# Patient Record
Sex: MALE | Race: WHITE
[De-identification: names, ages, dates, MRNs, and addresses within clinical notes are randomized per-mention and may not be internally consistent; named-entity substitution may affect disease eponyms.]

---

## 2018-07-28 ENCOUNTER — HOSPITAL ENCOUNTER (INPATIENT)
Dept: HOSPITAL 12 - SRC | Age: 48
LOS: 5 days | Discharge: HOME | DRG: 895 | End: 2018-08-02
Attending: INTERNAL MEDICINE | Admitting: INTERNAL MEDICINE
Payer: COMMERCIAL

## 2018-07-28 VITALS — BODY MASS INDEX: 25.92 KG/M2 | WEIGHT: 175 LBS | HEIGHT: 69 IN

## 2018-07-28 VITALS — DIASTOLIC BLOOD PRESSURE: 98 MMHG | SYSTOLIC BLOOD PRESSURE: 146 MMHG

## 2018-07-28 VITALS — SYSTOLIC BLOOD PRESSURE: 145 MMHG | DIASTOLIC BLOOD PRESSURE: 92 MMHG

## 2018-07-28 VITALS — DIASTOLIC BLOOD PRESSURE: 95 MMHG | SYSTOLIC BLOOD PRESSURE: 138 MMHG

## 2018-07-28 DIAGNOSIS — F41.9: ICD-10-CM

## 2018-07-28 DIAGNOSIS — Z81.1: ICD-10-CM

## 2018-07-28 DIAGNOSIS — E83.42: ICD-10-CM
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
## 2018-07-28 DIAGNOSIS — F10.230: Primary | ICD-10-CM

## 2018-07-28 DIAGNOSIS — F11.10: ICD-10-CM

## 2018-07-28 DIAGNOSIS — D69.6: ICD-10-CM

## 2018-07-28 DIAGNOSIS — Z87.891: ICD-10-CM

## 2018-07-28 LAB
ALP SERPL-CCNC: 117 U/L (ref 50–136)
ALT SERPL W/O P-5'-P-CCNC: 72 U/L (ref 16–63)
AMPHETAMINES UR QL SCN>1000 NG/ML: NEGATIVE
AMYLASE SERPL-CCNC: 114 U/L (ref 25–115)
AST SERPL-CCNC: 89 U/L (ref 15–37)
BARBITURATES UR QL SCN: NEGATIVE
BASOPHILS # BLD AUTO: 0.1 K/UL (ref 0–8)
BASOPHILS NFR BLD AUTO: 1.4 % (ref 0–2)
BILIRUB SERPL-MCNC: 0.8 MG/DL (ref 0.2–1)
BUN SERPL-MCNC: 8 MG/DL (ref 7–18)
CHLORIDE SERPL-SCNC: 96 MMOL/L (ref 98–107)
CO2 SERPL-SCNC: 28 MMOL/L (ref 21–32)
COCAINE UR QL SCN: NEGATIVE
CREAT SERPL-MCNC: 0.9 MG/DL (ref 0.6–1.3)
EOSINOPHIL # BLD AUTO: 0 K/UL (ref 0–0.7)
EOSINOPHIL NFR BLD AUTO: 0.7 % (ref 0–7)
EOSINOPHIL NFR BLD MANUAL: 1 % (ref 0–8)
ETHANOL SERPL-MCNC: 132 MG/DL (ref 0–0)
GLUCOSE SERPL-MCNC: 137 MG/DL (ref 74–106)
HCT VFR BLD AUTO: 46.1 % (ref 36.7–47.1)
HGB BLD-MCNC: 15.7 G/DL (ref 12.5–16.3)
LIPASE SERPL-CCNC: 208 U/L (ref 73–393)
LYMPHOCYTES # BLD AUTO: 1.2 K/UL (ref 20–40)
LYMPHOCYTES NFR BLD AUTO: 19.8 % (ref 20.5–51.5)
LYMPHOCYTES NFR BLD MANUAL: 21 % (ref 20–40)
MAGNESIUM SERPL-MCNC: 1.5 MG/DL (ref 1.8–2.4)
MCH RBC QN AUTO: 31.7 UUG (ref 23.8–33.4)
MCHC RBC AUTO-ENTMCNC: 34 G/DL (ref 32.5–36.3)
MCV RBC AUTO: 92.8 FL (ref 73–96.2)
MONOCYTES # BLD AUTO: 0.5 K/UL (ref 2–10)
MONOCYTES NFR BLD AUTO: 7.4 % (ref 0–11)
MONOCYTES NFR BLD MANUAL: 6 % (ref 2–10)
NEUTROPHILS # BLD AUTO: 4.4 K/UL (ref 1.8–8.9)
NEUTROPHILS NFR BLD AUTO: 70.7 % (ref 38.5–71.5)
NEUTS BAND NFR BLD MANUAL: 2 % (ref 0–10)
NEUTS SEG NFR BLD MANUAL: 70 % (ref 42–75)
OPIATES UR QL SCN: NEGATIVE
PCP UR QL SCN>25 NG/ML: NEGATIVE
PLATELET # BLD AUTO: 65 K/UL (ref 152–348)
POTASSIUM SERPL-SCNC: 3.1 MMOL/L (ref 3.5–5.1)
RBC # BLD AUTO: 4.97 MIL/UL (ref 4.06–5.63)
THC UR QL SCN>50 NG/ML: NEGATIVE
TSH SERPL DL<=0.005 MIU/L-ACNC: 1.16 MIU/ML (ref 0.36–3.74)
WBC # BLD AUTO: 6.2 K/UL (ref 3.6–10.2)
WS STN SPEC: 8 G/DL (ref 6.4–8.2)

## 2018-07-28 PROCEDURE — G0480 DRUG TEST DEF 1-7 CLASSES: HCPCS

## 2018-07-28 PROCEDURE — A4663 DIALYSIS BLOOD PRESSURE CUFF: HCPCS

## 2018-07-28 PROCEDURE — HZ2ZZZZ DETOXIFICATION SERVICES FOR SUBSTANCE ABUSE TREATMENT: ICD-10-PCS | Performed by: INTERNAL MEDICINE

## 2018-07-28 RX ADMIN — CLONIDINE HYDROCHLORIDE PRN MG: 0.1 TABLET ORAL at 22:03

## 2018-07-28 RX ADMIN — LORAZEPAM SCH MG: 1 TABLET ORAL at 22:03

## 2018-07-28 RX ADMIN — LORAZEPAM SCH MG: 1 TABLET ORAL at 16:50

## 2018-07-28 RX ADMIN — LORAZEPAM SCH MG: 1 TABLET ORAL at 13:25

## 2018-07-28 NOTE — NUR
PRN CLONIDINE REASSESSMENT



Patient has a BP of 124/82, HR 94. PRN Clonidine was effective. Safety measures in place, 
side rails up x2, bed locked in low position, call light within reach. Will continue to 
monitor.

## 2018-07-28 NOTE — NUR
K-dur, magnesium and taper medication;



Patient's potassium level is low 3.1 and magnesium level of 1.5. MD made aware. MD ordered 
K-dur 40meq PO one time and Mag-ox 400mg PO one time for supplement. MD also started patient 
on 4 day Ativan taper to help prevent withdrawal symptoms. Medications given as ordered.

## 2018-07-28 NOTE — NUR
PRN Medication;



Patient is currently showing s/s of withdrawals manifested by increased anxiety, 
restlessness, agitation, intermittent perspiration, stomach cramps, visible tremors, 
headache, unable to sit still, difficulty concentrating, racing thoughts with CIWA score of 
14. MD made aware. MD ordered PRN Ativan 1mg PO for CIWA of 14.  All safety measures 
secured. Will continue to monitor patient.

## 2018-07-28 NOTE — NUR
Admission Assessment;



Patient is a 48 year old male, A/O x4, appears well nourished and is dressed appropriately. 
Patient admitted to [facility] for medically supervised withdrawal from ETOH. Patient 
appears nervous, anxious, flushed face noted, intermittent perspiration,Tremors noted, teary 
eyes, Appears older than age stated. Patient has poor eye contact, fearful, 
worried,verbalized feelings of guilt and shame but remained cooperative during nurse 
assessment. Patient denies history of hypertension/cardiac disorder, patient verbalized "I 
am very nervous right now, my vital signs might be elevated but it's usually WNL". Admitting 
vital signs are as follows; /90,  , Temperature 97.6, respirations of 18, Spo2 
98% on room air, denies pain. Patient denies history of seizures and denies any allergies. 



Discussed substance use history. Patient started drinking ETOH when he was 18 year old, 
progressed to daily use 3 years ago. Since then he has been drinking White Wine 
approximately 1-3 bottles /day (750ml bottle), last drank 7/27/18 at 7PM patient reported 
drinking 2 750ml bottles of wine last night. Patient struggled with multiple attempts on 
achieving sobriety. Patient achieved 5 years of sobriety from 3571-4954. Most recent 
sobriety was from June1, 2018 until July 15, 2018. At this rate patient has been drinking on 
daily basis for the past 2 weeks. During his periods of sobriety patient experienced 
tremors, anxiety and agitation. Patient has never been to detox or outpatient treatment 
before. Patient stated "I enjoy drinking wine after a long day of work, but for the past 2 
weeks i have been drinking a lot more. Drinking ETOH has been affecting my work performance 
and i cannot let this addiction affect my career, i worked hard to be in my position . I 
need help, i need to fix my life". Triggers for relapse includes, availability of ETOH. 
Patient also stated "Also my friends are alcoholics". Patient is open to post treatment 
discharge options and is motivated to remain sober. 



Discussed medical and psychiatric history. He reported that he is taking Multivitamin 1 tab 
PO daily and Fish oil 1000mg daily for supplement. Patient did not bring medications 
mentioned. Patient reported family history of diabetes and hypertension. Patient denies 
other medical /psychiatric condition. Skin check done. Small redness noted on back of 
patient right leg, site is intact. Bruised toe also noted on patient's left foot. Site is 
intact. Oriented patient to unit. Educated patient regarding the importance of compliance to 
unit protocol and policies, patient verbalized understanding. He denies having PCP or 
psychiatrist. Patient is admitted to room 329 under the care of Dr. Carmona. Patient is 
currently showing s/s of withdrawals with CIWA score of 14, reported to MD. All safety 
measures secured. Will continue to monitor patient. 

-------------------------------------------------------------------------------

Addendum: 07/28/18 at 1544 by ANITHA JUNG LVN

-------------------------------------------------------------------------------

Patient denies suicidal ideations.

## 2018-07-28 NOTE — NUR
CIWA assessment;



Patient is currently showing s/s of withdrawals manifested by increased anxiety, 
restlessness, agitation, intermittent perspiration, stomach cramps, visible tremors, 
headache, unable to sit still, difficulty concentrating, racing thoughts with CIWA score of 
13. Patient received PRN medication to help reduce withdrawal symptoms.

## 2018-07-28 NOTE — NUR
End of shift note;



Patient is AOX4, presented with anxiety, restlessness, agitation, intermittent perspiration, 
stomach cramps, visible tremors, headache, unable to sit still, difficulty concentrating, 
racing thoughts , headache, last CIWA score of 18 noted at 1600. Patient was started on a 4 
day Ativan taper to help manage withdrawal symptoms. Patient received PRN Ativan 1mg for 
CIWA of 14 noted to be effective. Potassium and magnesium were supplemented by K-dur and Mag 
-ox per MD order. Patient remained compliant with treatment plan and medication regime. 
Encouraged patient to verbalized feelings and to report any further s/s of withdrawals. 
Medications were effective in reducing withdrawal symptoms. All safety measures secured. Met 
all needs.

## 2018-07-28 NOTE — NUR
CIWA assessment;



Patient is currently showing s/s of withdrawals manifested by increased anxiety, 
restlessness, agitation, intermittent perspiration, stomach cramps, visible tremors, 
headache, unable to sit still, difficulty concentrating, racing thoughts , headache with 
CIWA score of 16. Patient received PRN and scheduled medications to help reduce withdrawal 
symptoms. Patient was also started on a 5 day Ativan taper. Will continue to monitor 
patient.

## 2018-07-28 NOTE — NUR
CIWA DEFERRED



CIWA deferred at this time due to patient sleeping; to be assessed and scored while patient 
is awake. Safety measures in place, side rails up x2, bed locked in low position, call light 
within reach. Will continue to monitor.

## 2018-07-28 NOTE — NUR
PRN CLONIDINE



Patient has a BP of 146/98, . PRN Clonidine given PO. Safety measures in place, side 
rails up x2, bed locked in low position, call light within reach. Will monitor for 
effectiveness.

## 2018-07-28 NOTE — NUR
START OF SHIFT



Patient is a 48-year-old male admitted this morning, 7/28/18, for ETOH withdrawal. Patient 
is currently on a 4-day Ativan taper, tolerating well. Patients last CIWA was 18 per 
endorsement. Patient received PRN Ativan 1mg once today; noted to be effective. Upon 
assessment, patient appears flushed, disheveled and diaphoretic. Patient has gross tremors 
and is visibly tired. Patient is alert and oriented x4 and his gait is mostly steady but 
patient needs to walk slowly to keep his balance. Patient is quiet and withdrawn. Patient is 
on fall and seizure precautions with no history of seizure. Safety measures in place, side 
rails up x2, bed locked in low position, call light within reach. Will continue to monitor.

## 2018-07-28 NOTE — NUR
Preadmission Note:

Pt is 48M, AOx4. Pt reported NKA. Pt observed to be very anxious, with racing thoughts but 
still motivated to be in treatment. Pt stable at this time and able to answer assessment 
questions. Pt reports hx of drinking white wine. No medical hx or previous seizures 
reported. Instructed pt about unit policies and procedures. Pt verbalized understanding. 
Will admit to [facility] floor.

## 2018-07-28 NOTE — NUR
Re-assessment;



Patient current CIWA score remained elevated manifested by anxiety, agitation, intermittent 
perspiration, stomach cramps, tremors headache, difficulty concentrating. MD on unit to 
evaluate patient.

## 2018-07-28 NOTE — NUR
CIWA 21



Patient is awake and requested snacks from the kitchen. Patient is diaphoretic, flushed, and 
tremulous. Patient reports "moderate tingling" around his nose and cheeks. Current CIWA is 
21. SN to administer meds as ordered. Safety measures in place, call light within reach. 
Will continue to monitor.

## 2018-07-29 VITALS — SYSTOLIC BLOOD PRESSURE: 123 MMHG | DIASTOLIC BLOOD PRESSURE: 81 MMHG

## 2018-07-29 VITALS — DIASTOLIC BLOOD PRESSURE: 78 MMHG | SYSTOLIC BLOOD PRESSURE: 121 MMHG

## 2018-07-29 VITALS — SYSTOLIC BLOOD PRESSURE: 135 MMHG | DIASTOLIC BLOOD PRESSURE: 89 MMHG

## 2018-07-29 VITALS — SYSTOLIC BLOOD PRESSURE: 118 MMHG | DIASTOLIC BLOOD PRESSURE: 90 MMHG

## 2018-07-29 VITALS — SYSTOLIC BLOOD PRESSURE: 138 MMHG | DIASTOLIC BLOOD PRESSURE: 89 MMHG

## 2018-07-29 VITALS — SYSTOLIC BLOOD PRESSURE: 138 MMHG | DIASTOLIC BLOOD PRESSURE: 92 MMHG

## 2018-07-29 LAB
ALP SERPL-CCNC: 152 U/L (ref 50–136)
ALT SERPL W/O P-5'-P-CCNC: 59 U/L (ref 16–63)
AST SERPL-CCNC: 78 U/L (ref 15–37)
BASOPHILS # BLD AUTO: 0.1 K/UL (ref 0–8)
BASOPHILS NFR BLD AUTO: 1.2 % (ref 0–2)
BASOPHILS NFR BLD MANUAL: 1 % (ref 0–2)
BILIRUB SERPL-MCNC: 0.9 MG/DL (ref 0.2–1)
BUN SERPL-MCNC: 12 MG/DL (ref 7–18)
CHLORIDE SERPL-SCNC: 103 MMOL/L (ref 98–107)
CO2 SERPL-SCNC: 32 MMOL/L (ref 21–32)
CREAT SERPL-MCNC: 0.9 MG/DL (ref 0.6–1.3)
EOSINOPHIL # BLD AUTO: 0.2 K/UL (ref 0–0.7)
EOSINOPHIL NFR BLD AUTO: 4.2 % (ref 0–7)
EOSINOPHIL NFR BLD MANUAL: 5 % (ref 0–8)
GLUCOSE SERPL-MCNC: 137 MG/DL (ref 74–106)
HCT VFR BLD AUTO: 44.2 % (ref 36.7–47.1)
HGB BLD-MCNC: 15.4 G/DL (ref 12.5–16.3)
LYMPHOCYTES # BLD AUTO: 1.1 K/UL (ref 20–40)
LYMPHOCYTES NFR BLD AUTO: 23.1 % (ref 20.5–51.5)
LYMPHOCYTES NFR BLD MANUAL: 20 % (ref 20–40)
MCH RBC QN AUTO: 32.1 UUG (ref 23.8–33.4)
MCHC RBC AUTO-ENTMCNC: 35 G/DL (ref 32.5–36.3)
MCV RBC AUTO: 92 FL (ref 73–96.2)
MONOCYTES # BLD AUTO: 0.5 K/UL (ref 2–10)
MONOCYTES NFR BLD AUTO: 10.6 % (ref 0–11)
MONOCYTES NFR BLD MANUAL: 8 % (ref 2–10)
NEUTROPHILS # BLD AUTO: 2.8 K/UL (ref 1.8–8.9)
NEUTROPHILS NFR BLD AUTO: 60.9 % (ref 38.5–71.5)
NEUTS BAND NFR BLD MANUAL: 5 % (ref 0–10)
NEUTS SEG NFR BLD MANUAL: 61 % (ref 42–75)
PLATELET # BLD AUTO: 50 K/UL (ref 152–348)
POTASSIUM SERPL-SCNC: 4.1 MMOL/L (ref 3.5–5.1)
RBC # BLD AUTO: 4.8 MIL/UL (ref 4.06–5.63)
WBC # BLD AUTO: 4.7 K/UL (ref 3.6–10.2)
WS STN SPEC: 6.5 G/DL (ref 6.4–8.2)

## 2018-07-29 RX ADMIN — Medication SCH MG: at 09:16

## 2018-07-29 RX ADMIN — LORAZEPAM SCH MG: 1 TABLET ORAL at 20:56

## 2018-07-29 RX ADMIN — LORAZEPAM SCH MG: 1 TABLET ORAL at 16:28

## 2018-07-29 RX ADMIN — THERA TABS SCH UDTAB: TAB at 09:15

## 2018-07-29 RX ADMIN — Medication SCH CAP: at 09:16

## 2018-07-29 RX ADMIN — LORAZEPAM SCH MG: 1 TABLET ORAL at 12:33

## 2018-07-29 RX ADMIN — LORAZEPAM SCH MG: 1 TABLET ORAL at 09:16

## 2018-07-29 RX ADMIN — FOLIC ACID SCH MG: 1 TABLET ORAL at 09:17

## 2018-07-29 NOTE — NUR
ABDOMINAL U/S

Pt informed about abdominal ultrasound to be done in the morning and advised not to eat or 
drink after midnight.Pt verbalizes understanding.

## 2018-07-29 NOTE — NUR
End of shift note;



Patient is AOX4, presented with anxiety, restlessness, agitation, intermittent perspiration, 
stomach cramps, visible tremors, headache, unable to sit still, difficulty concentrating, 
racing thoughts , headache, last CIWA score of 18 noted at 1600. Patient was placed on a 4 
day Ativan taper to help manage withdrawal symptoms. Patient remained compliant with 
treatment plan and medication regime. Encouraged patient to verbalized feelings and to 
report any further s/s of withdrawals. Encouraged patient to participate in group therapies. 
Medications were effective in reducing withdrawal symptoms. All safety measures secured. Met 
all needs.

## 2018-07-29 NOTE — NUR
END OF SHIFT



Patient is a 48-year-old male admitted yesterday, 7/28/18, for ETOH withdrawal. Patient is 
currently on a 4-day Ativan taper, tolerating well; today will be second day of taper. 
Patients last CIWA was 21. Patient received PRN Clonidine for elevated BP; noted to be 
effective. Patient slept for 8 hours, total intake of 1,000mL, void x1, stool x0. Patient is 
on fall and seizure precautions with no history of seizure. Safety measures in place, side 
rails up x2, bed locked in low position, call light within reach. Will endorse to day shift.

## 2018-07-29 NOTE — NUR
PRN REASSESSMENT

Pt stated he had an episode of diarrhea immediately after Imodium was given.No c/o diarrhea 
noted at this time,will continue to monitor.

## 2018-07-29 NOTE — NUR
CIWA assessment;



Patient is AOx4,  currently showing s/s of withdrawals manifested by increased anxiety, 
restlessness, agitation, intermittent perspiration, stomach cramps, visible tremors, 
headache, unable to sit still, difficulty concentrating, racing thoughts , headache with 
CIWA score of 18. Patient received scheduled medications to help reduce withdrawal symptoms. 
Will continue to monitor patient.

## 2018-07-29 NOTE — NUR
Start of shift note;



Received report from night nurse. Patient is a 47 y/o male admitted on 7/28/18 for ETOH 
withdrawals. Patient is AOX4, presented with anxiety, restlessness, agitation, appears older 
than age stated, intermittent perspiration, stomach cramps, visible tremors, headache, 
unable to sit still, difficulty concentrating, racing thoughts , headache, shows feelings of 
shame and guilt, last CIWA score of 21 per endorsement. Patient was started on a 4 day 
Ativan taper to help manage withdrawal symptoms. Patient received PRN Clonidine not elevated 
BP noted to be effective per endorsement. Patient slept for 8 hours. All safety measures 
secured. Will continue to monitor patient.

## 2018-07-29 NOTE — NUR
CIWA assessment;



Patient is AOx4,  currently showing s/s of withdrawals manifested by increased anxiety, 
restlessness, agitation, intermittent perspiration, stomach cramps, visible tremors, 
headache, unable to sit still, difficulty concentrating, racing thoughts , headache with 
CIWA score of 19. Patient received scheduled medications to help reduce withdrawal symptoms. 
Will continue to monitor patient.

## 2018-07-29 NOTE — NUR
PRN IMODIUM

Pt c/o having diarrhea,stated that he had 5-6 loose stools today.Imodium 4 mg PO given per 
orders;PO fluids encouraged, will continue to monitor.

## 2018-07-29 NOTE — NUR
CIWA DEFERRED



CIWA deferred at this time due to patient sleeping; to be assessed while patient is awake, 
per protocol. Respirations even and unlabored. Safety measures in place, call light within 
reach. Will continue to monitor.

## 2018-07-29 NOTE — NUR
START OF SHIFT

Pt is a 49 y/o male admitted  for ETOH withdrawals. Patient is A/A/O X 4, continues on 
Ativan taper as ordered and is tolerating well. Pt  presented with anxiety, restlessness and 
generalized aches and pain. Last CIWA score  was 18 at 1600. Pt remains  compliant with 
treatment plan and medication regime. No PRN medications were given during the day.Pt is to 
be NPO after midnight for abdominal ultrasound tomorrow .Pt  encouraged patient to verbalize 
needs and concerns and to report any  s/s of withdrawals. All safety measures in place.  Bed 
on lowest position with side rails x2 up for safety. Call light within reach, will continue 
to monitor.

## 2018-07-29 NOTE — NUR
CIWA DEFERRED



CIWA deferred due to patient sleeping; to be assessed and scored while patient is awake. 
Safety measures in place, side rails up x2, bed locked in low position, call light within 
reach. Will continue to monitor.

## 2018-07-29 NOTE — NUR
CIWA assessment;



Patient is AOx4,  currently showing s/s of withdrawals manifested by increased anxiety, 
restlessness, agitation, intermittent perspiration, stomach cramps, visible tremors, 
headache, unable to sit still, difficulty concentrating, racing thoughts , headache with 
CIWA score of 21. Patient to receive scheduled medications to help reduce withdrawal 
symptoms. Notified MD regarding patient's LAB results. Will continue to monitor patient.

## 2018-07-30 VITALS — DIASTOLIC BLOOD PRESSURE: 106 MMHG | SYSTOLIC BLOOD PRESSURE: 160 MMHG

## 2018-07-30 VITALS — DIASTOLIC BLOOD PRESSURE: 87 MMHG | SYSTOLIC BLOOD PRESSURE: 138 MMHG

## 2018-07-30 VITALS — SYSTOLIC BLOOD PRESSURE: 145 MMHG | DIASTOLIC BLOOD PRESSURE: 98 MMHG

## 2018-07-30 VITALS — DIASTOLIC BLOOD PRESSURE: 96 MMHG | SYSTOLIC BLOOD PRESSURE: 152 MMHG

## 2018-07-30 VITALS — DIASTOLIC BLOOD PRESSURE: 80 MMHG | SYSTOLIC BLOOD PRESSURE: 139 MMHG

## 2018-07-30 VITALS — DIASTOLIC BLOOD PRESSURE: 87 MMHG | SYSTOLIC BLOOD PRESSURE: 126 MMHG

## 2018-07-30 LAB
ALP SERPL-CCNC: 188 U/L (ref 50–136)
ALT SERPL W/O P-5'-P-CCNC: 74 U/L (ref 16–63)
AST SERPL-CCNC: 71 U/L (ref 15–37)
BASOPHILS # BLD AUTO: 0 K/UL (ref 0–8)
BASOPHILS NFR BLD AUTO: 0.7 % (ref 0–2)
BASOPHILS NFR BLD MANUAL: 0 % (ref 0–2)
BILIRUB SERPL-MCNC: 0.6 MG/DL (ref 0.2–1)
BUN SERPL-MCNC: 12 MG/DL (ref 7–18)
CHLORIDE SERPL-SCNC: 101 MMOL/L (ref 98–107)
CO2 SERPL-SCNC: 33 MMOL/L (ref 21–32)
CREAT SERPL-MCNC: 1 MG/DL (ref 0.6–1.3)
EOSINOPHIL # BLD AUTO: 0.3 K/UL (ref 0–0.7)
EOSINOPHIL NFR BLD AUTO: 5.9 % (ref 0–7)
EOSINOPHIL NFR BLD MANUAL: 4 % (ref 0–8)
GLUCOSE SERPL-MCNC: 122 MG/DL (ref 74–106)
HCT VFR BLD AUTO: 47.9 % (ref 36.7–47.1)
HGB BLD-MCNC: 16.4 G/DL (ref 12.5–16.3)
LYMPHOCYTES # BLD AUTO: 1.7 K/UL (ref 20–40)
LYMPHOCYTES NFR BLD AUTO: 28.6 % (ref 20.5–51.5)
LYMPHOCYTES NFR BLD MANUAL: 29 % (ref 20–40)
MAGNESIUM SERPL-MCNC: 1.8 MG/DL (ref 1.8–2.4)
MCH RBC QN AUTO: 31.8 UUG (ref 23.8–33.4)
MCHC RBC AUTO-ENTMCNC: 34 G/DL (ref 32.5–36.3)
MCV RBC AUTO: 93.1 FL (ref 73–96.2)
MONOCYTES # BLD AUTO: 0.6 K/UL (ref 2–10)
MONOCYTES NFR BLD AUTO: 9.9 % (ref 0–11)
MONOCYTES NFR BLD MANUAL: 3 % (ref 2–10)
NEUTROPHILS # BLD AUTO: 3.2 K/UL (ref 1.8–8.9)
NEUTROPHILS NFR BLD AUTO: 54.9 % (ref 38.5–71.5)
NEUTS BAND NFR BLD MANUAL: 0 % (ref 0–10)
NEUTS SEG NFR BLD MANUAL: 64 % (ref 42–75)
PLATELET # BLD AUTO: 56 K/UL (ref 152–348)
POTASSIUM SERPL-SCNC: 3.4 MMOL/L (ref 3.5–5.1)
RBC # BLD AUTO: 5.15 MIL/UL (ref 4.06–5.63)
WBC # BLD AUTO: 5.8 K/UL (ref 3.6–10.2)
WS STN SPEC: 7.6 G/DL (ref 6.4–8.2)

## 2018-07-30 RX ADMIN — LORAZEPAM SCH MG: 1 TABLET ORAL at 14:43

## 2018-07-30 RX ADMIN — THERA TABS SCH UDTAB: TAB at 08:36

## 2018-07-30 RX ADMIN — FOLIC ACID SCH MG: 1 TABLET ORAL at 08:35

## 2018-07-30 RX ADMIN — LORAZEPAM SCH MG: 1 TABLET ORAL at 08:36

## 2018-07-30 RX ADMIN — CLONIDINE HYDROCHLORIDE PRN MG: 0.1 TABLET ORAL at 08:36

## 2018-07-30 RX ADMIN — Medication SCH CAP: at 08:36

## 2018-07-30 RX ADMIN — LORAZEPAM SCH MG: 1 TABLET ORAL at 21:09

## 2018-07-30 RX ADMIN — Medication SCH MG: at 08:36

## 2018-07-30 NOTE — NUR
CIWA SCORE

CIWA score 14, patient continues to presents anxiety, agitation, restless, sweats, bilateral 
hand tremors. Will cont to monitor.

## 2018-07-30 NOTE — NUR
CIWA=10

Pt is feeling anxious,restless,c/o having mild tremors and headache.PRN medications offered 
but Pt refused to take any.No c/o diarrhea noted,will continue to monitor.

## 2018-07-30 NOTE — NUR
PRN CLONIDINE

Patient's blood noted 162/106, PRN clonidine 0.1mg PO as ordered. Will cont to monitor and 
reassess the patient.

## 2018-07-30 NOTE — NUR
START OF SHIFT

Pt is a 48 year old male admitted for ETOH withdrawal and continues to be on  Ativan taper, 
tolerating well. Pt received in room continues  to c/o  anxiety, restlessness,superficially 
bright and pleasant  upon approach .PRN Clonidine 0.1mg PO  was given per day shift and 
noted to be effective. PO fluids encouraged  as tolerated. Last CIWA score was 12. 
Encouraged patient to attend groups activities to learn new coping skills. All safety 
measures in place, call light within reach, will continue to monitor for safety.

## 2018-07-30 NOTE — NUR
END OF SHIFT

Pt is a 49 y/o male admitted  for ETOH withdrawals. Patient is A/A/O X 4, continues on 
Ativan taper as ordered and is tolerating well. Pt  presented with anxiety, restlessness and 
generalized aches and pain. Last CIWA score  was 18 at 2000. Pt remains  compliant with 
treatment plan and medication regime. PRN medications Imodium was given for c/o diarrhea x 1 
and was effective.Pt  has been  NPO after midnight for abdominal ultrasound today .Pt  slept 
8 hrs,fluid intake was 1000 mls,voided x 1,has B/M  x2. All safety measures in place.  Bed 
on lowest position with side rails x2 up for safety. Call light within reach, will endorse 
care to day shift nurse.

## 2018-07-30 NOTE — NUR
START OF SHIFT NOTE

Received report from night nurse, 48 year old male admitted for ETOH withdrawal patient 
continues on Ativan taper tolerating well. Per endorsement patient received PRN Imodium 
effective per night nurse, last CIWA score was 18, slept for 8 hours. Received patient alert 
awake anxious, agitation, bilateral hand termones noted,light headed, numbness and tingling 
on bilateral hands.Educated patient importance of attending groups and activities with good 
verbal understanding.  All safety measures in place, Call light within reach. Will cont to 
monitor.

## 2018-07-30 NOTE — NUR
Clinical Therapy Note:

Met with patient in his room where he was sad and tearful. Pt. is showing some insight into 
his alcoholism and recognizes he needs treatment. He commented " I know I cannot stop 
drinking when I start but I keep doing it."  He was prompted to attend groups but at this 
point wants to stay in his room. He responded well to one to one intervention and wanted to 
know when this clinician would return. Advised him she would check in with him tomorrow too. 
Group attendance was encouraged. Patient is very open to an aftercare program. Sandie will 
meet with him to discuss this.

Patient's mood is depressed with congruent affect. He cried a lot in this writer's presence. 
He is not suicidal. He has a good support system and says his uncle drove him to [facility]. 
Patient commented "  I have a great job and career and many people who love me. Why do I 
keep drinking?" Psychoeducation regarding alcohol dependence was provided to the patient. 
This is his first attempt at hospital detox. he says he usually did this at home after a 
sousa. He got less and less control over his binges. Pt. says he would stay home over 
vacations and just drink. 

Patient's request to meet with HR from his employer was passed on to GINGER Jensen 
. Patient is concerned about his work status.

## 2018-07-30 NOTE — NUR
END OF SHIFT NOTE

Gave report to night nurse, 48 year old male admitted for ETOH withdrawal and continues with 
Ativan taper tolerating well. Patient continues to anxiety, agitation, restless, labile 
facial expression, anhedonia, unkempt room, diaphoretic, bilateral hand tremors noted. 
patient was given scheduled medications and PRN Clonidine 0.1mg PO noted to be effective. 
Encourage PO fluids as tolerated. Last CIWA score was 12. Encourage patient to attend groups 
activities to learn new coping skills. All safety measures in place, call light within 
reach. Patient endorse to night nurse in stable condition.

## 2018-07-30 NOTE — NUR
CIWA SCORE

CIWA score 15, Patient presented with  anxiety, agitation, restless, sweats, patient is due 
for scheduled medications. Will cont to monitor.

## 2018-07-30 NOTE — NUR
CIWA DEFERRED.

Pt is resting comfortable in bed with eyes closed;breathing is even and non labored; no s/s 
of distress noted; CIWA deferred due to patient being asleep;all safety measures in place, 
will continue to monitor.

## 2018-07-30 NOTE — NUR
CIWA ASSESSMENT

CIWA score noted 12, patient reported decreased in anxiety, agitation, restless.Patient 
reported detox medication are effective with evidence of low CIWA score 12. Will to monitor.

## 2018-07-31 VITALS — SYSTOLIC BLOOD PRESSURE: 137 MMHG | DIASTOLIC BLOOD PRESSURE: 89 MMHG

## 2018-07-31 VITALS — DIASTOLIC BLOOD PRESSURE: 117 MMHG | SYSTOLIC BLOOD PRESSURE: 156 MMHG

## 2018-07-31 VITALS — SYSTOLIC BLOOD PRESSURE: 129 MMHG | DIASTOLIC BLOOD PRESSURE: 86 MMHG

## 2018-07-31 VITALS — DIASTOLIC BLOOD PRESSURE: 90 MMHG | SYSTOLIC BLOOD PRESSURE: 145 MMHG

## 2018-07-31 VITALS — DIASTOLIC BLOOD PRESSURE: 102 MMHG | SYSTOLIC BLOOD PRESSURE: 147 MMHG

## 2018-07-31 VITALS — SYSTOLIC BLOOD PRESSURE: 121 MMHG | DIASTOLIC BLOOD PRESSURE: 77 MMHG

## 2018-07-31 VITALS — DIASTOLIC BLOOD PRESSURE: 89 MMHG | SYSTOLIC BLOOD PRESSURE: 140 MMHG

## 2018-07-31 PROCEDURE — HZ31ZZZ INDIVIDUAL COUNSELING FOR SUBSTANCE ABUSE TREATMENT, BEHAVIORAL: ICD-10-PCS

## 2018-07-31 RX ADMIN — THERA TABS SCH UDTAB: TAB at 08:17

## 2018-07-31 RX ADMIN — LORAZEPAM SCH MG: 1 TABLET ORAL at 08:17

## 2018-07-31 RX ADMIN — CLONIDINE HYDROCHLORIDE PRN MG: 0.1 TABLET ORAL at 20:10

## 2018-07-31 RX ADMIN — LORAZEPAM SCH MG: 1 TABLET ORAL at 20:10

## 2018-07-31 RX ADMIN — FOLIC ACID SCH MG: 1 TABLET ORAL at 08:17

## 2018-07-31 RX ADMIN — Medication SCH CAP: at 08:17

## 2018-07-31 RX ADMIN — Medication SCH MG: at 08:17

## 2018-07-31 RX ADMIN — CLONIDINE HYDROCHLORIDE PRN MG: 0.1 TABLET ORAL at 00:27

## 2018-07-31 NOTE — NUR
CIWA ASSESSMENT

CIWA score 15, Patient presented with  anxiety, agitation, restless, sweats, bilateral hand 
tremors, light headed. Patient is due for scheduled medications. Will cont to monitor.

## 2018-07-31 NOTE — NUR
START OF SHIFT



Pt is a 47 y/o male admitted on 07/28/18 for ETOH withdrawal. Pt is on a 4 day Ativan taper, 
tolerating well. Last CIWA 12 and no PRNs administered during day shift. Upon assessment pt 
presents with anxiety, restlessness, racing thoughts, disheveled appearance, sweats, flushed 
skin, intermittent tremors, elevated BP, elevated HR and is withdrawn. Medications due. 
Safety measures in place. Call light within reach. Will continue to monitor.

## 2018-07-31 NOTE — NUR
PRN CLONIDINE ADMINISTRATION



/117 and . Safety measures in place. Call light within reach. Will continue to 
monitor.

## 2018-07-31 NOTE — NUR
CIWA SCORE

CIWA score 12, Patient back from groups, and continues to exhibits s/s of withdrawal such as 
anxiety, agitation, restless, sweats, bilateral hand tremors noted. Will cont to monitor.

## 2018-07-31 NOTE — NUR
PRN CLONIDINE REASSESSMENT



/102 and HR 89. Pt has improvement in anxiety. Safety measures in place. Call light 
within reach. Will continue to monitor.

## 2018-07-31 NOTE — NUR
CIWA SCORE

CIWA score 14, Patient continues to exhibits s/s of withdrawal such as anxiety, agitation, 
restless, sweats, anhedonia,  Will cont to monitor.

## 2018-07-31 NOTE — NUR
PRN BENADRYL ADMINISTRATION



Pt requests sleep aid. Pt has difficulty falling asleep. Safety measures in place. Call 
light within reach. Will continue to monitor.

## 2018-07-31 NOTE — NUR
----- Message from EDVIN Day sent at 1/16/2017  2:41 PM PST -----  Please inform parent that triglycerides are elevated, and HDL (the good cholesterol) is a little low. We would recommend a diet low in saturated fats. No fast food or highly processed foods.   PRN F/U

PRN IS EFFECTIVE.PT IS CALM AND RESTING WITH EYES CLOSED,NO S/S OF ANXIETY N0TED.WILL 
CONTINUE TO MONITOR.

## 2018-07-31 NOTE — NUR
CIWA 11



Pt presents with anxiety, restlessness, racing thoughts, disheveled appearance, sweats, 
flushed skin, intermittent tremors, elevated BP, elevated HR and is withdrawn. Pt reports 
that he sometimes has trouble falling and staying asleep.

## 2018-07-31 NOTE — NUR
END OF SHIFT NOTE

Gave report to night nurse, patient continues with Ativan taper tolerating well. Patient 
presented with anxiety, agitation, restless, anhedonia, bilateral hand tremors, labile 
facial expression, sweats, patient was given scheduled medications and did not receive any 
PRN. Patient able to eat 100% of his meals.  Encourage pt to attend group activities to 
learn new coping skills. Encourage PO fluids as tolerated. All safety measures in place, 
Call light within reach. Patient endorsed to night nurse in stable condition.

## 2018-07-31 NOTE — NUR
START OF SHIFT NOTE

Received report from night nurse, 48 year old male admitted for ETOH withdrawal patient 
continues on Ativan taper tolerating well. Per endorsement patient received PRN Clonidine 
effective per night nurse, last CIWA score was 10, slept for 7 hours. Received patient alert 
awake anxious, agitation, bilateral hand termones noted, light headed, numbness and tingling 
on bilateral hands. Educated patient importance of attending groups and activities with good 
verbal understanding.  All safety measures in place, Call light within reach. Will cont to 
monitor.

## 2018-07-31 NOTE — NUR
Therapist prompted client to attend twice daily group therapy sessions. Client explained 
that he is feeling very fatigued but that he would try to attend the second therapy group of 
the day.

## 2018-07-31 NOTE — NUR
PRN BENADRYL REASSESSMENT



Pt remains awake, appears more relaxed. Safety measures in place. Call light within reach. 
Will continue to monitor.

## 2018-08-01 VITALS — DIASTOLIC BLOOD PRESSURE: 78 MMHG | SYSTOLIC BLOOD PRESSURE: 119 MMHG

## 2018-08-01 VITALS — SYSTOLIC BLOOD PRESSURE: 139 MMHG | DIASTOLIC BLOOD PRESSURE: 102 MMHG

## 2018-08-01 VITALS — SYSTOLIC BLOOD PRESSURE: 151 MMHG | DIASTOLIC BLOOD PRESSURE: 112 MMHG

## 2018-08-01 VITALS — DIASTOLIC BLOOD PRESSURE: 99 MMHG | SYSTOLIC BLOOD PRESSURE: 143 MMHG

## 2018-08-01 VITALS — SYSTOLIC BLOOD PRESSURE: 123 MMHG | DIASTOLIC BLOOD PRESSURE: 84 MMHG

## 2018-08-01 PROCEDURE — HZ41ZZZ GROUP COUNSELING FOR SUBSTANCE ABUSE TREATMENT, BEHAVIORAL: ICD-10-PCS

## 2018-08-01 RX ADMIN — THERA TABS SCH UDTAB: TAB at 08:45

## 2018-08-01 RX ADMIN — CLONIDINE HYDROCHLORIDE PRN MG: 0.1 TABLET ORAL at 13:37

## 2018-08-01 RX ADMIN — Medication SCH MG: at 08:45

## 2018-08-01 RX ADMIN — FOLIC ACID SCH MG: 1 TABLET ORAL at 08:45

## 2018-08-01 RX ADMIN — Medication SCH CAP: at 08:45

## 2018-08-01 NOTE — NUR
CIWA Assessment

CIWA: 6.  Pt noted with anxiety and agitation.  Respirations even and unlabored.  Will 
continue to monitor.

## 2018-08-01 NOTE — NUR
Start of shift note

Received report from day shift nurse.  Pt is a 47 yo male, A+Ox4, presenting to [facility] for ETOH withdrawal.  Pt noted to be restless, agitated, and anxious.  Pt has no 
medical HX to report.  Pt has completed 4 day Ativan taper, tolerated well, and is due for 
discharge tomorrow.  Respirations even and unlabored.  Will continue to monitor.

## 2018-08-01 NOTE — NUR
END OF SHIFT



Pt is a 49 y/o male admitted on 07/28/18 for ETOH withdrawal. Pt is on a 4 day Ativan taper, 
tolerating well. Pt presented with anxiety, restlessness, racing thoughts, disheveled 
appearance, sweats, flushed skin, intermittent tremors, elevated BP, elevated HR and was 
withdrawn. Scheduled medications and PRN Benadryl administered, effective In S/S of 
withdrawal as verbalized by pt. Last CIWA 11. Pt slept 7 hours. Intake 1500 ml, void x 3, 
stool x 0. Safety measures in place. Call light within reach. Pts needs have been met. 
Endorsed to day shift nurse.

## 2018-08-01 NOTE — NUR
CIWA Assessment 



CIWA of 7. Pt. presents with restlessness, and anxiety. Pt. compliant with medication 
regiment. Will continue to monitor pt.'s behavior for safety.

## 2018-08-01 NOTE — NUR
PRN medication



Pt.'s BP at this time is 151/112. Gave clonidine 0.1mg PO at this time. Pt. stable and 
denies pain or an abnormal levels of anxiety. Will continue to monitor pt.'s behavior for 
safety.

## 2018-08-01 NOTE — NUR
Start of Shift 



Pt. is a 49 y/o male admitted for the medially managed withdrawal from ETOH. Pt. was placed 
on a 4 day Ativan taper which was completed yesterday 7/31/18. Endorse from previous shift 
pt. presented with anxiety, restlessness, racing thoughts, disheveled appearance, 
diaphoresis, flushed skin, intermittent tremors, elevated blood pressure, and withdrawn. PRN 
 Benadryl given during previous shift. Received pt. in room. Pt. in bed covered by his 
linen. Pt. arousable to name. Encouraged pt. to verbalize concerns and emotions. Pt. states 
I feel okay, just want to sleep a little more. Last CIWA of 11. Safety measures in place. 
Will continue to monitor pt.s behavior for safety.

## 2018-08-01 NOTE — NUR
CIWA Assessment 



CIWA of 6. Pt. presents with restlessness, and anxiety. Pt. compliant with medication 
regiment. Will continue to monitor pt.'s behavior for safety.

## 2018-08-01 NOTE — NUR
CIWA 11



Pt presents with anxiety, restlessness, disheveled appearance, sweats, flushed skin, 
intermittent tremors, and is withdrawn.

## 2018-08-01 NOTE — NUR
End of Shift



Pt. is a 49 y/o male admitted for the medially managed withdrawal from ETOH. Pt. was placed 
on a 4 day Ativan taper which was completed yesterday 7/31/18. Pt. is set to be discharge 
tomorrow morning to [facility]. Pt. presented throughout shift with anxiety, 
restlessness, racing thoughts, and elevated blood pressure. Upon approach pt. is pleasant 
but guarded. PRN  clonidine given for elevated BP.  CIWA of 6. Safety measures in place. 
Will endorse pt.s behavior for safety.

## 2018-08-01 NOTE — NUR
PRN Re-Assessment. 



Pt.'s current BP is 130/90. Medication effective. Will continue to monitor pt.'s behavior 
for safety.

## 2018-08-02 VITALS — DIASTOLIC BLOOD PRESSURE: 85 MMHG | SYSTOLIC BLOOD PRESSURE: 132 MMHG

## 2018-08-02 RX ADMIN — Medication SCH CAP: at 08:53

## 2018-08-02 RX ADMIN — FOLIC ACID SCH MG: 1 TABLET ORAL at 08:53

## 2018-08-02 RX ADMIN — CLONIDINE HYDROCHLORIDE PRN MG: 0.1 TABLET ORAL at 07:55

## 2018-08-02 RX ADMIN — Medication SCH MG: at 08:53

## 2018-08-02 RX ADMIN — THERA TABS SCH UDTAB: TAB at 08:53

## 2018-08-02 NOTE — NUR
End of shift note

Pt was continuously noted with restlessness, anxiety, and agitation.  Pt remained in room 
for majority of shift except to get food from kitchen, to go smoke on smoking patio, and to 
interact with other patients in recreational room.  Pt remained cooperative and compliant 
with all aspects of treatment.  Pt was not given any PRN medications during shift.  Pt has 
completed 4 day Ativan taper, tolerated well, and is due for discharge today.  Pt slept for 
a total of 4 HRS.  Last CIWA: 6 @2000.  Respirations even and unlabored.  Will endorse to 
day shift nurse.

## 2018-08-02 NOTE — NUR
DISCHARGE:

Pt is A/O X 4. He denies S/I and H/I. He reports he will be going to outpatient therapy and 
12 step meetings when he leaves here. Belongings returned. Educated Pt on discharge 
instructions and medications. Pt expressed verbal understanding of education. He was 
escorted to [facility] where he was transported by  family home at [id number].

## 2018-08-02 NOTE — NUR
V/S refused and CIWA deferred for sleep.  Respirations even and unlabored.  Will continue to 
monitor.

## 2018-08-02 NOTE — NUR
START OF SHIFT:

Received Pt A/O X 4. He presents with anxious mood and withdrawn affect. He states he is 
motivated to stay sober and will be attending outpatient and attending 12 step meetings. 
Ativan taper completed. CIWA 6.He reports some anxiety intermittently. Will continue with 
discharge process.